# Patient Record
(demographics unavailable — no encounter records)

---

## 2024-10-22 NOTE — ASSESSMENT
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 cellcept [id number] Prednisone (d/c in early Apri 2017) - no further steroids. On Plaquenil -continue optho visits as planned - reminded that she needs to see optho  Will repeat SLE and LN disease activity labs today. continue HCQ and follow optho  2) HTN - on Losartan 100mg-hctz 12.5 and working with renal and keeping BP log   3) Bone health - - Osteoporosis on dexa (7-2023) - continue ca and d - options d/w patient - prolia  #1 :  , Prolia # 2: 4-2024 Prolia # 3:    4) HCM COVID 19 vaccine - received 2 + booster - advised to get booster at pharmacy [facility] - 9-2024 pcv 20 shingles vaccine  9-29-23 and 12-19-23 flu shot 10-    Today's medical care services serve as the continuing focal point for needed health care services that are part of ongoing care related to a patient's one or more serious conditions or a complex condition.   Time spent on the encounter included but is not limited to, preparing to see the patient, obtaining and/or reviewing separately obtained history, performing the evaluation, counseling and educating, independently interpreting results with communication to the patient, order placement, referring and/or communicating with other health professionals as described, and documenting clinical information in the electronic health record.   FRACISCO SOTELO was counseled on the differential, workup, disease course, and treatment/management.   Education was provided to FRACISCO SOTELO during this encounter. All questions and concerns were answered and addressed in detail.  FRACISCO ANASTASIATRAVIS verbalized understanding and agreed to the plan.   FRACISCO SOTELO has been instructed to call for an earlier appointment if new symptoms develop in the interim. FRACISCO SOTELO has been instructed to make a follow-up appointment in 3 months

## 2024-10-22 NOTE — HISTORY OF PRESENT ILLNESS
[FreeTextEntry1] : \par  \par   [Advancing age] : advancing age [Glucocorticoid] : glucocorticoid use [Family history of OP] : no family history of osteoporosis [Low Ca/Vit D intake] : normal calcium and vitamin D intake [Inflammatory arthritis] : no inflammatory arthritis [Parental hx of hip fx] : no parental history of hip fracture [Low body weight] : no history of low body weight [Hyperparathyroidism] : no hyperparathyroidism [Radiation therapy hx] : no radiation therapy history [Previous fragility Fx] : no previous fragility fracture [Testosterone deficiency] : no testosterone deficiency [High Falls Risk] : not a high falls risk [Frequent falls] : no history of frequent falls [Premature menopause] : no history of premature menopause [Current Smoker] : not a current smoker [Excessive alcohol consumption] : no history of excessive alcohol consumption [Patient currently asymptomatic] : patient currently asymptomatic [History of Nephritis] : the patient has a history of nephritis [Currently Experiencing] : currently experiencing [Headache] : no headaches [Sicca] : no sicca [Rash] : no rash [Chest Pain] : no chest pain [Shortness of Breath] : no shortness of breath [Abdominal Pain] : no abdominal pain [Psychological Symptoms] : no psychological symptoms [Fatigue] : no fatigue [Arthritis] : no arthritis [Arthralgias] : no arthralgias [Sun Sensitivity] : sun sensitivity [TextBox_2] : 8-2023 [TextBox_6] : none [TextBox_37] : menopause at 50 menarche 12  2 healthy pregnancies  breastfeed > 5 years for her two children in total  [FreeTextEntry3] : 2016 [FreeTextEntry7] : + DSDNA  [FreeTextEntry4] :  Cellcept ([id number]) [FreeTextEntry6] : Class V nephritis (bx in May 2016) + sun sensitivity (rash) and not using sunblock.

## 2024-10-22 NOTE — HISTORY OF PRESENT ILLNESS
[FreeTextEntry1] : \par  \par   [Advancing age] : advancing age [Glucocorticoid] : glucocorticoid use [Family history of OP] : no family history of osteoporosis [Low Ca/Vit D intake] : normal calcium and vitamin D intake [Inflammatory arthritis] : no inflammatory arthritis [Parental hx of hip fx] : no parental history of hip fracture [Low body weight] : no history of low body weight [Hyperparathyroidism] : no hyperparathyroidism [Radiation therapy hx] : no radiation therapy history [Previous fragility Fx] : no previous fragility fracture [Testosterone deficiency] : no testosterone deficiency [High Falls Risk] : not a high falls risk [Frequent falls] : no history of frequent falls [Premature menopause] : no history of premature menopause [Current Smoker] : not a current smoker [Excessive alcohol consumption] : no history of excessive alcohol consumption [Patient currently asymptomatic] : patient currently asymptomatic [History of Nephritis] : the patient has a history of nephritis [Currently Experiencing] : currently experiencing [Headache] : no headaches [Sicca] : no sicca [Rash] : no rash [Chest Pain] : no chest pain [Shortness of Breath] : no shortness of breath [Abdominal Pain] : no abdominal pain [Psychological Symptoms] : no psychological symptoms [Fatigue] : no fatigue [Arthritis] : no arthritis [Arthralgias] : no arthralgias [Sun Sensitivity] : sun sensitivity [TextBox_2] : 8-2023 [TextBox_6] : none [TextBox_37] : menopause at 50 menarche 12  2 healthy pregnancies  breastfeed > 5 years for her two children in total  [FreeTextEntry3] : 2016 [FreeTextEntry7] : + DSDNA  [FreeTextEntry4] :  Cellcept ([id number]) [FreeTextEntry6] : Class V nephritis (bx in May 2016) + sun sensitivity (rash) and not using sunblock.

## 2024-10-22 NOTE — DATA REVIEWED
[FreeTextEntry1] : Laboratory and radiology studies that were personally reviewed at today's visit are summarized below and above:  DEXA (8-): osteoporosis (spine = -2.5, fem neck = -1.5, total hip = -0.7) renal note (4-26-23): s/p cellcept, clincial remission of nephrotic range proteinuria  4-1-22:  cr = 0.64, esr=45, yimi 1:640, dsdna = 8 (indeterminate), acl - negative,  12-16-21:  Dexa:  WNL    5-18-21:  Ribs X-ray:  WNL

## 2024-10-22 NOTE — PHYSICAL EXAM
[General Appearance - Alert] : alert [General Appearance - In No Acute Distress] : in no acute distress [General Appearance - Well Nourished] : well nourished [General Appearance - Well Developed] : well developed [Sclera] : the sclera and conjunctiva were normal [Extraocular Movements] : extraocular movements were intact [Neck Appearance] : the appearance of the neck was normal [Edema] : there was no peripheral edema [No Spinal Tenderness] : no spinal tenderness [FreeTextEntry1] : no synovitis, no tender joints [] : no rash [No Focal Deficits] : no focal deficits [Oriented To Time, Place, And Person] : oriented to person, place, and time [Impaired Insight] : insight and judgment were intact

## 2024-10-22 NOTE — ASSESSMENT
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 cellcept [id number] Prednisone (d/c in early Apri 2017) - no further steroids. On Plaquenil -continue optho visits as planned - reminded that she needs to see optho  Will repeat SLE and LN disease activity labs today. continue HCQ and follow optho  2) HTN - on Losartan 100mg-hctz 12.5 and working with renal and keeping BP log   3) Bone health - - Osteoporosis on dexa (7-2023) - continue ca and d - options d/w patient - prolia  #1 :  , Prolia # 2: 4-2024 Prolia # 3:    4) HCM COVID 19 vaccine - received 2 + booster - advised to get booster at pharmacy [facility] - 9-2024 pcv 20 shingles vaccine  9-29-23 and 12-19-23 flu shot 10-    Today's medical care services serve as the continuing focal point for needed health care services that are part of ongoing care related to a patient's one or more serious conditions or a complex condition.   Time spent on the encounter included but is not limited to, preparing to see the patient, obtaining and/or reviewing separately obtained history, performing the evaluation, counseling and educating, independently interpreting results with communication to the patient, order placement, referring and/or communicating with other health professionals as described, and documenting clinical information in the electronic health record.   FRACISCO SOTELO was counseled on the differential, workup, disease course, and treatment/management.   Education was provided to FRACISCO SOTELO during this encounter. All questions and concerns were answered and addressed in detail.  FRACISCO ANASTASIATRAVIS verbalized understanding and agreed to the plan.   FRACISCO SOTELO has been instructed to call for an earlier appointment if new symptoms develop in the interim. FRACISCO SOTELO has been instructed to make a follow-up appointment in 3 months

## 2024-10-28 NOTE — DATA REVIEWED
[FreeTextEntry1] : 7/22/2024 - BLE venous duplex Negative for DVT/SVT bilaterally. Right - reflux in cfv, gsv, ssv. Left - reflux in GSV measuring 7.7mm at junction, 5.8 mm prox, reflux > 0.5 seconds.

## 2024-10-28 NOTE — PHYSICAL EXAM
[2+] : left 2+ [Varicose Veins Of Lower Extremities] : bilaterally [Ankle Swelling On The Left] : moderate [] : bilaterally [Ankle Swelling On The Right] : mild [Calm] : calm [Ankle Swelling (On Exam)] : not present [de-identified] : Well-appearing  [de-identified] : EOMI, anicteric  [de-identified] : soft, nt, nd  [de-identified] : moves all extremities  [de-identified] : no wounds on bilateral feet [de-identified] : A&Ox4

## 2024-10-28 NOTE — ASSESSMENT
[FreeTextEntry1] : FRACISCO SOTELO is a 53 year old female presents for evaluation.   > Venous insufficiency, CEAP C4a - s/p left GSV RFA and distal GSV Varithena (9/2024) - doing well post procedure - For symptom management, recommend use of compression stockings (20-30 mmhg, prescription given), leg elevation, and ambulation. - Plan for follow up in 6 months with repeat right leg studies.

## 2024-10-28 NOTE — HISTORY OF PRESENT ILLNESS
[FreeTextEntry1] : FRACISCO SOTELO is a 53 year old female who presents for evaluation of bleeding varicose vein.   Patient states that 2-3 weeks ago, she had bleeding from a varicose vein on the medial leg spontaneously. It was controlled with pressure and she was evaluated by ED at [facility] and was referred to vascular for follow up. Denied any trauma to the leg.  No prior similar incident. Patient has had varicose veins for 2 years. Recently started to use compression stockings and has improvement of her leg pain, swelling, tightness, or heaviness.  Personal history of DVT - None Family history of DVT - None Family history of venous insufficiency or varicose veins - None Current compression stocking usage - Just started to wear compression stockings.  Has 2 children Works in retail so stands for work  + PMH: HTN, osteoporosis, lupus  + PSH: denies + FH: no history of dvt or varicose veins + SH: never smoker, no etoh use + Aller: NKDA  PCP is Dr. Ora Magana. [de-identified] : 7/22/2024 - Pt presents for follow up. No further bleeding from the left leg. When she is wearing her compression stockings, she feels that her leg swelling, tightness, and heaviness is controlled. Continues to elevate her legs.   9/3/2024 - left GSV RFA  9/24/2024 - left GSV varithena  10/28/2024 - Pt presents for follow up. Reports medial left ankle pain for the past 4-5 days that is intermittent. Otherwise has been feeling well post procedure. She thinks may be related to her lupus and is seeing her rheumatologist again soon.

## 2024-12-11 NOTE — HISTORY OF PRESENT ILLNESS
[FreeTextEntry1] : 53-year-old female with SLE, nephrotic syndrome secondary to Class V membranous lupus nephritis (kidney biopsy performed on 4/26/16) presents to clinic for follow-up visit. Pt. was last seen in renal clinic on 10/2/24.  Pt. currently feels well. Pt. with elevated BP readings during clinic visit today. Pt. reports elevated SBP readings (120s-150s) at home. Pt. did not  prescription for oral Amlodipine 5 mg once daily (that was prescribed during previous visit). No fever, CP, SOB, nausea/vomiting, dizziness, abdominal pain or leg swelling. Pt. scheduled to see her rheumatologist Dr. Pineda on 1/23/25. Pt. currently taking hydroxychloroquine 200 mg once daily for SLE.

## 2024-12-11 NOTE — PHYSICAL EXAM
[General Appearance - Alert] : alert [General Appearance - In No Acute Distress] : in no acute distress [Sclera] : the sclera and conjunctiva were normal [Outer Ear] : the ears and nose were normal in appearance [Neck Appearance] : the appearance of the neck was normal [Jugular Venous Distention Increased] : there was no jugular-venous distention [Respiration, Rhythm And Depth] : normal respiratory rhythm and effort [Auscultation Breath Sounds / Voice Sounds] : lungs were clear to auscultation bilaterally [Heart Rate And Rhythm] : heart rate was normal and rhythm regular [Heart Sounds] : normal S1 and S2 [Edema] : there was no peripheral edema [Bowel Sounds] : normal bowel sounds [Abdomen Soft] : soft [No CVA Tenderness] : no ~M costovertebral angle tenderness [Abnormal Walk] : normal gait [] : no rash [Oriented To Time, Place, And Person] : oriented to person, place, and time [Impaired Insight] : insight and judgment were intact [Affect] : the affect was normal [Mood] : the mood was normal

## 2024-12-11 NOTE — END OF VISIT
[FreeTextEntry3] : I was physically present for the key portions of the evaluation and management (E/M) service provided. I agree with the above history, ROS, physical exam, assessment, and plan which I have reviewed and edited where appropriate. I have also reviewed the assessment and management of lupus nephritis and HTN with the patient during clinic visit today.  Pt. with history of Class V (membranous) lupus nephritis and HTN. Scr and UPCR WNL on labs done in November 2024. BP elevated during clinic visit today. Pt. advised to take oral Amlodipine 10 mg once daily. Low salt diet advised. Monitor BP and labs. Avoid nephrotoxins.

## 2024-12-11 NOTE — REVIEW OF SYSTEMS
[Negative] : Heme/Lymph [Fever] : no fever [Eyesight Problems] : no eyesight problems [Sore Throat] : no sore throat [Chest Pain] : no chest pain [Lower Ext Edema] : no lower extremity edema [Shortness Of Breath] : no shortness of breath [Abdominal Pain] : no abdominal pain [Dysuria] : no dysuria [Limb Swelling] : no limb swelling [Itching] : no itching [Dizziness] : no dizziness [Muscle Weakness] : no muscle weakness

## 2024-12-11 NOTE — ASSESSMENT
[FreeTextEntry1] : 1. Lupus nephritis: Pt. with history of Class V (membranous) lupus nephritis, received CellCept (as per rheumatologist) in the past. Pt. remains in clinical and lab remission of nephrotic syndrome. UPCR WNL (0.1) on labs done in rheumatology clinic on 11/30/24. Last Scr was WNL at 0.7 and serum albumin was WNL at 4 on labs done on 11/30/24. Pt. scheduled to see her rheumatologist Dr. Pineda on 1/23/25. Pt. currently taking hydroxychloroquine 200 mg once daily for SLE. Monitor kidney function and UPCR.   2. HTN: Pt. with elevated BP readings during clinic visit today. Pt. reports elevated SBP readings (120s-150s) at home. Pt. advised to take oral Amlodipine 5 mg once daily, new prescription sent to the pharmacy. Continue Losartan-HCTZ 100-12.5mg once daily. Low salt diet advised. Monitor/log BP daily at home.  RTC: 6 months

## 2025-03-21 NOTE — PHYSICAL EXAM
[General Appearance - Alert] : alert [General Appearance - In No Acute Distress] : in no acute distress [General Appearance - Well Nourished] : well nourished [General Appearance - Well Developed] : well developed [Sclera] : the sclera and conjunctiva were normal [Extraocular Movements] : extraocular movements were intact [Neck Appearance] : the appearance of the neck was normal [Edema] : there was no peripheral edema [No Spinal Tenderness] : no spinal tenderness [] : no rash [No Focal Deficits] : no focal deficits [Oriented To Time, Place, And Person] : oriented to person, place, and time [Impaired Insight] : insight and judgment were intact [FreeTextEntry1] : no synovitis, no tender joints

## 2025-03-21 NOTE — HISTORY OF PRESENT ILLNESS
[Advancing age] : advancing age [Glucocorticoid] : glucocorticoid use [Patient currently asymptomatic] : patient currently asymptomatic [History of Nephritis] : the patient has a history of nephritis [Currently Experiencing] : currently experiencing [Sun Sensitivity] : sun sensitivity [FreeTextEntry1] : \par  \par   [Family history of OP] : no family history of osteoporosis [Low Ca/Vit D intake] : normal calcium and vitamin D intake [Inflammatory arthritis] : no inflammatory arthritis [Parental hx of hip fx] : no parental history of hip fracture [Low body weight] : no history of low body weight [Hyperparathyroidism] : no hyperparathyroidism [Radiation therapy hx] : no radiation therapy history [Previous fragility Fx] : no previous fragility fracture [Testosterone deficiency] : no testosterone deficiency [High Falls Risk] : not a high falls risk [Frequent falls] : no history of frequent falls [Premature menopause] : no history of premature menopause [Current Smoker] : not a current smoker [Excessive alcohol consumption] : no history of excessive alcohol consumption [Headache] : no headaches [Sicca] : no sicca [Rash] : no rash [Chest Pain] : no chest pain [Shortness of Breath] : no shortness of breath [Abdominal Pain] : no abdominal pain [Psychological Symptoms] : no psychological symptoms [Fatigue] : no fatigue [Arthritis] : no arthritis [Arthralgias] : no arthralgias [TextBox_2] : 8-2023 [TextBox_6] : none [TextBox_37] : menopause at 50 menarche 12  2 healthy pregnancies  breastfeed > 5 years for her two children in total  [FreeTextEntry3] : 2016 [FreeTextEntry7] : + DSDNA  [FreeTextEntry4] :  Cellcept ([id number]) [FreeTextEntry6] : Class V nephritis (bx in May 2016) + sun sensitivity (rash) and not using sunblock.

## 2025-03-21 NOTE — ASSESSMENT
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 cellcept [id number] Prednisone (d/c in early Apri 2017) - no further steroids. On Plaquenil -continue optho visits as planned - reminded that she needs to see optho  Will repeat SLE and LN disease activity labs today. continue HCQ and follow optho  2) HTN - on Losartan 100mg-hctz 12.5 and working with renal and keeping BP log   3) Bone health - - Osteoporosis on dexa (7-2023) - continue ca and d - options d/w patient - prolia  #1 :  , Prolia # 2: 4-2024 Prolia # 3:  Prolia # 4 is due (4-2025)  4) HCM COVID 19 vaccine - received 2 + booster - advised to get booster at pharmacy [facility] - 9-2024 pcv 20 shingles vaccine  9-29-23 and 12-19-23 flu shot 10-  requests to have lipids checked today   Today's medical care services serve as the continuing focal point for needed health care services that are part of ongoing care related to a patient's one or more serious conditions or a complex condition.   Time spent on the encounter included but is not limited to, preparing to see the patient, obtaining and/or reviewing separately obtained history, performing the evaluation, counseling and educating, independently interpreting results with communication to the patient, order placement, referring and/or communicating with other health professionals as described, and documenting clinical information in the electronic health record.   FRACISCO SOTELO was counseled on the differential, workup, disease course, and treatment/management.   Education was provided to FRACISCO SOTELO during this encounter. All questions and concerns were answered and addressed in detail.  FRACISCO SOTELO verbalized understanding and agreed to the plan.   FRACISCO SOTELO has been instructed to call for an earlier appointment if new symptoms develop in the interim. FRACISCO SOTELO has been instructed to make a follow-up appointment in 3 months

## 2025-04-21 NOTE — HISTORY OF PRESENT ILLNESS
[FreeTextEntry1] : FRACISCO SOTELO is a 53 year old female who presents for evaluation of bleeding varicose vein.   Patient states that 2-3 weeks ago, she had bleeding from a varicose vein on the medial leg spontaneously. It was controlled with pressure and she was evaluated by ED at [facility] and was referred to vascular for follow up. Denied any trauma to the leg.  No prior similar incident. Patient has had varicose veins for 2 years. Recently started to use compression stockings and has improvement of her leg pain, swelling, tightness, or heaviness.  Personal history of DVT - None Family history of DVT - None Family history of venous insufficiency or varicose veins - None Current compression stocking usage - Just started to wear compression stockings.  Has 2 children Works in retail so stands for work  + PMH: HTN, osteoporosis, lupus  + PSH: denies + FH: no history of dvt or varicose veins + SH: never smoker, no etoh use + Aller: NKDA  PCP is Dr. Ora Magana. [de-identified] : 7/22/2024 - Pt presents for follow up. No further bleeding from the left leg. When she is wearing her compression stockings, she feels that her leg swelling, tightness, and heaviness is controlled. Continues to elevate her legs.   9/3/2024 - left GSV RFA  9/24/2024 - left GSV varithena  10/28/2024 - Pt presents for follow up. Reports medial left ankle pain for the past 4-5 days that is intermittent. Otherwise has been feeling well post procedure. She thinks may be related to her lupus and is seeing her rheumatologist again soon.   4/21/2025 - Pt presents for follow up. Denies leg pain currently. Denies leg swelling. Remains consistent with use of compression stockings, leg elevation, and remains active on her feet.

## 2025-04-21 NOTE — DATA REVIEWED
[FreeTextEntry1] : 4/21/2025 - RLE venous duplex negative for dvt/svt/insufficiency.   7/22/2024 - BLE venous duplex Negative for DVT/SVT bilaterally. Right - reflux in cfv, gsv, ssv. Left - reflux in GSV measuring 7.7mm at junction, 5.8 mm prox, reflux > 0.5 seconds.

## 2025-04-21 NOTE — PHYSICAL EXAM
[2+] : left 2+ [] : bilaterally [Ankle Swelling On The Right] : mild [Calm] : calm [Ankle Swelling (On Exam)] : not present [Varicose Veins Of Lower Extremities] : not present [de-identified] : Well-appearing  [de-identified] : EOMI, anicteric  [de-identified] : soft, nt, nd  [de-identified] : moves all extremities  [de-identified] : no wounds on bilateral feet [de-identified] : A&Ox4

## 2025-06-11 NOTE — REVIEW OF SYSTEMS
[Negative] : Heme/Lymph [Fever] : no fever [Eyesight Problems] : no eyesight problems [Sore Throat] : no sore throat [Lower Ext Edema] : no lower extremity edema [Chest Pain] : no chest pain [Shortness Of Breath] : no shortness of breath [Dysuria] : no dysuria [Abdominal Pain] : no abdominal pain [Itching] : no itching [Limb Swelling] : no limb swelling [Muscle Weakness] : no muscle weakness [Dizziness] : no dizziness

## 2025-06-11 NOTE — END OF VISIT
[FreeTextEntry3] : I was physically present for the key portions of the evaluation and management (E/M) service provided. I agree with the above history, ROS, physical exam, assessment, and plan which I have reviewed and edited where appropriate. I have also reviewed the assessment and management of lupus nephritis and HTN with the patient during clinic visit today.  Pt. with history of Class V (membranous) lupus nephritis and HTN. UPCR WNL on labs done in November 2024. Scr WNL (0.7) on labs done in March 2025. BP in acceptable range at home and during clinic visit today. Continue with current BP meds. Low salt diet advised. Monitor BP and labs. Avoid nephrotoxins.

## 2025-06-11 NOTE — PHYSICAL EXAM
[General Appearance - Alert] : alert [General Appearance - In No Acute Distress] : in no acute distress [Outer Ear] : the ears and nose were normal in appearance [Sclera] : the sclera and conjunctiva were normal [Neck Appearance] : the appearance of the neck was normal [Jugular Venous Distention Increased] : there was no jugular-venous distention [Respiration, Rhythm And Depth] : normal respiratory rhythm and effort [Auscultation Breath Sounds / Voice Sounds] : lungs were clear to auscultation bilaterally [Heart Rate And Rhythm] : heart rate was normal and rhythm regular [Heart Sounds] : normal S1 and S2 [Bowel Sounds] : normal bowel sounds [Edema] : there was no peripheral edema [No CVA Tenderness] : no ~M costovertebral angle tenderness [Abdomen Soft] : soft [Abnormal Walk] : normal gait [] : no rash [Oriented To Time, Place, And Person] : oriented to person, place, and time [Impaired Insight] : insight and judgment were intact [Affect] : the affect was normal [Mood] : the mood was normal

## 2025-06-11 NOTE — HISTORY OF PRESENT ILLNESS
[FreeTextEntry1] : 54-year-old female with SLE, nephrotic syndrome secondary to Class V membranous lupus nephritis (kidney biopsy performed on 4/26/16) presents to clinic for follow-up visit. Pt. was last seen in renal clinic on 12/11/24.  Pt. currently feels well. Pt. reports SBP readings of 120s-130s at home. Pt. admits compliance with her medications. No fever, CP, SOB, nausea/vomiting, dizziness, abdominal pain or leg swelling. Pt. scheduled to see her rheumatologist Dr. Pineda on 7/1/25. Pt. currently taking hydroxychloroquine 200 mg once daily for SLE.

## 2025-06-11 NOTE — ASSESSMENT
[FreeTextEntry1] : 1. Lupus nephritis: Pt. with history of Class V (membranous) lupus nephritis, received CellCept (as per rheumatologist) in the past. Pt. remains in clinical and lab remission of nephrotic syndrome. UPCR WNL (0.1) on labs done in rheumatology clinic on 11/30/24. Last Scr was WNL at 0.7 and serum albumin was WNL at 4.3 on labs done on 3/21/25. Pt. scheduled to see her rheumatologist Dr. Pineda on 7/1/25. Pt. currently taking hydroxychloroquine 200 mg once daily for SLE. Monitor kidney function and UPCR.   2. HTN: Pt. with acceptable BP readings during clinic visit today. Continue with oral Amlodipine 5 mg once daily and Losartan-HCTZ 100-12.5mg once daily. Low salt diet advised. Monitor/log BP daily at home.  RTC: 6 months

## 2025-07-01 NOTE — PHYSICAL EXAM
[General Appearance - Alert] : alert [General Appearance - In No Acute Distress] : in no acute distress [General Appearance - Well Nourished] : well nourished [General Appearance - Well Developed] : well developed [Sclera] : the sclera and conjunctiva were normal [Extraocular Movements] : extraocular movements were intact [Neck Appearance] : the appearance of the neck was normal [No Spinal Tenderness] : no spinal tenderness [] : no rash [No Focal Deficits] : no focal deficits [Oriented To Time, Place, And Person] : oriented to person, place, and time [Impaired Insight] : insight and judgment were intact [FreeTextEntry1] : no synovitis, no tender joints

## 2025-07-01 NOTE — ASSESSMENT
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 cellcept [id number] Prednisone (d/c in early Apri 2017) - no further steroids. On Plaquenil -continue optho visits as planned - reminded that she needs to see optho  Will repeat SLE and LN disease activity labs today. continue HCQ and follow optho  2) HTN - on Losartan 100mg-hctz 12.5 and working with renal and keeping BP log   3) Bone health - - Osteoporosis on dexa (7-2023) - continue ca and d - options d/w patient - prolia  #1 :  , Prolia # 2: 4-2024 Prolia # 3:  Prolia # 4-  4) HCM COVID 19 vaccine - received 2 + booster - advised to get booster at pharmacy [facility] - 9-2024 pcv 20 shingles vaccine  9-29-23 and 12-19-23 flu shot 10-    Today's medical care services serve as the continuing focal point for needed health care services that are part of ongoing care related to a patient's one or more serious conditions or a complex condition.   Time spent on the encounter included but is not limited to, preparing to see the patient, obtaining and/or reviewing separately obtained history, performing the evaluation, counseling and educating, independently interpreting results with communication to the patient, order placement, referring and/or communicating with other health professionals as described, and documenting clinical information in the electronic health record.   FRACISCO SOTELO was counseled on the differential, workup, disease course, and treatment/management.   Education was provided to FRACISCO SOTELO during this encounter. All questions and concerns were answered and addressed in detail.  FRACISCO SOTELO verbalized understanding and agreed to the plan.   FRACISCO SOTELO has been instructed to call for an earlier appointment if new symptoms develop in the interim. FRACISCO SOTELO has been instructed to make a follow-up appointment in 3 months

## 2025-07-01 NOTE — HISTORY OF PRESENT ILLNESS
[Advancing age] : advancing age [Glucocorticoid] : glucocorticoid use [Patient currently asymptomatic] : patient currently asymptomatic [History of Nephritis] : the patient has a history of nephritis [Currently Experiencing] : currently experiencing [Sun Sensitivity] : sun sensitivity [FreeTextEntry1] : \par  \par   [Family history of OP] : no family history of osteoporosis [Low Ca/Vit D intake] : normal calcium and vitamin D intake [Inflammatory arthritis] : no inflammatory arthritis [Parental hx of hip fx] : no parental history of hip fracture [Low body weight] : no history of low body weight [Hyperparathyroidism] : no hyperparathyroidism [Radiation therapy hx] : no radiation therapy history [Previous fragility Fx] : no previous fragility fracture [Testosterone deficiency] : no testosterone deficiency [High Falls Risk] : not a high falls risk [Frequent falls] : no history of frequent falls [Premature menopause] : no history of premature menopause [Current Smoker] : not a current smoker [Excessive alcohol consumption] : no history of excessive alcohol consumption [Headache] : no headaches [Sicca] : no sicca [Rash] : no rash [Chest Pain] : no chest pain [Shortness of Breath] : no shortness of breath [Abdominal Pain] : no abdominal pain [Psychological Symptoms] : no psychological symptoms [Fatigue] : no fatigue [Arthritis] : no arthritis [Arthralgias] : no arthralgias [TextBox_2] : 8-2023 [TextBox_6] : none [TextBox_37] : menopause at 50 menarche 12  2 healthy pregnancies  breastfeed > 5 years for her two children in total  [FreeTextEntry3] : 2016 [FreeTextEntry7] : + [facility]  [FreeTextEntry4] :  Cellcept ([id number]) [FreeTextEntry6] : Class V nephritis (bx in May 2016) + sun sensitivity (rash) and not using sunblock.